# Patient Record
(demographics unavailable — no encounter records)

---

## 2025-04-29 NOTE — PROCEDURE
[FreeTextEntry1] : CXR reveals a mildly enlarged heart; no evidence of infiltrate or effusion-normal lungs  Full PFT reveals normal flows; FEV1 was 1.94 L which is 102% of predicted; normal lung volumes; normal diffusion at 17.49, which is 100% of predicted; normal flow volume loop. MASOOD: MIP 92%, % PFTs were performed to evaluate for SOB  FENO was 17; a normal value being less than 25 Fractional exhaled nitric oxide (FENO) is regarded as a simple, noninvasive method for assessing eosinophilic airway inflammation. Produced by a variety of cells within the lung, nitric oxide (NO) concentrations are generally low in healthy individuals. However, high concentrations of NO appear to be involved in nonspecific host defense mechanisms and chronic inflammatory diseases such as asthma. The American Thoracic Society (ATS) therefore has recommended using FENO to aid in the diagnosis and monitoring of eosinophilic airway inflammation and asthma, and for identifying steroid responsive individuals whose chronic respiratory symptoms may be caused by airway inflammation.

## 2025-04-29 NOTE — HISTORY OF PRESENT ILLNESS
[TextBox_4] : Ms. WARE is a 70 year old female with a history of chronic hep B (cured), HLD, HTN, gastric metaplasia, osteopenia, benign thyroid nodule, low vitamin D, anxiety, allergies, gerd  presenting to the office today for follow up pulmonary evaluation. Her chief complaint is  -she notes persistent coughing present  -she notes bowels are regular -she notes vision is stable -she notes getting enough sleep -she notes sleeping for 6-7 hours -she notes her sinuses are always dry -she denies any GERD Sx -she notes she is unsure if Xolair helped her in the past because she only did it 2-3 times  -she denies any headaches, nausea, emesis, fever, chills, sweats, chest pain, chest pressure, coughing, wheezing, palpitations, diarrhea, constipation, dysphagia, vertigo, arthralgias, myalgias, leg swelling, itchy eyes, itchy ears, heartburn, reflux, or sour taste in the mouth.

## 2025-04-29 NOTE — ADDENDUM
[FreeTextEntry1] : Documented by Ibrahima Moran acting as a scribe for Dr. Konstantin Horton on 04/29/2025. All medical record entries made by the Scribe were at my, Dr. Konstantin Horton's, direction and personally dictated by me on 04/29/2025. I have reviewed the chart and agree that the record accurately reflects my personal performance of the history, physical exam, assessment and plan. I have also personally directed, reviewed, and agree with the discharge instructions.

## 2025-04-29 NOTE — ASSESSMENT
[FreeTextEntry1] : Ms. WARE is a 70 year old female with a history of chronic hep B (cured), HLD, HTN, gastric metaplasia, osteopenia, benign thyroid nodule, low vitamin D, anxiety, allergies, gerd  presenting to the office today for a follow up pulmonary evaluation. Her chief complaint is sob/ chronic cough/ likely multifactorial/ moderate to severe asthma/ allergies/ gerd/ +mildred - NC with Xolair- persistent cough c/w untreated asthma  Her shortness of breath is multifactorial due to: -poor mechanics of breathing  -out of shape / overweight -pulmonary disease -+asthma  -cardiac disease   Problem 1: Moderate to severe persistent Asthma- active -s/p Trelegy 1 puff QD (200) move to Breztri 2 puffs BID - Add Ventolin 2 puffs Q6H, pre-exercise  -add Singulair 10 mg QHS  Asthma is  believed to be caused by inherited (genetic) and environmental factor, but its exact cause is unknown. Asthma may be triggered by allergens, lung infections, or irritants in the air. Asthma triggers are different for each person   Problem 1A: Elevated IgE (300+) -s/p xolair in progress - NC - reevaluate - patient states that her asthma symptoms have improved since starting the Xolair. Less exacerbations and use of rescue inhalers.  -Xolair is a recombinant DNA- derived humanized IgG1K monoclonal antibody that selectively binds ot human immunoglobulin E (IgE). Xolair is produced by a Chinese hamster ovary cell suspension culture in nutrient medium containing the antibiotic gentamicin. Gentamicin is not detectable in the final product. Xolair is a sterile, white, preservative free, lyophilized powder contained in a single use vial that is reconstituted with sterile water for suspension. Side effects include: wheezing, tightness of the chest, trouble breathing, hives, skin rash, feeling anxious or light-headed, fainting, warmth or tingling under skin, or swelling of face, lips, or tongue   Problem 1B: biologic eval -[place] over others -check eos/IgE -Type 2 asthma, which accounts for approximately 70% of all cases, is indicated by high eosinophil counts and elevated levels of T2 cytokines. Six biologic agents are FDA-approved to treat moderate-to-severe asthma by targeting various cytokines and cell surface receptors involved in the inflammatory process in asthma. Several biologic therapies are also indicated for other inflammatory conditions marked by high eosinophils. Efficacy of biologic therapy should be assessed after 4 to 6 months of treatment.  Problem 2: Chronic Cough (Asthma/LPR/Allergies/ sinus) DDx - #1 asthma Any cough greater than three weeks duration-differential diagnosis includes-asthma, upper airway cough syndrome, post nasal drip syndrome, gastroesophageal reflux, laryngopharyngeal reflux, cardiac disease (congestive heart failure, medicines, effects, etc), medication effects (b-blockers, ace inhibitors, ARBs, glaucoma meds, etc.), smoking, infectious, multifactorial, etc.  Inhaler technique reviewed as well as oral hygiene techniques reviewed with patient. Avoidance of cold air, extremes of temperature, rescue inhaler should be used before exercise. Order of medication reviewed with patient. Recommended use of a cool mist humidifier in the bedroom.    Problem 2: Allergies  -add Clarinex 5 mg QAM  -s/p blood work: asthma profile +, food IgE panel+, eosinophil level, IgE level +, Vitamin D level  -continue Olopatadine 0.6% 1 sniff BID  Environmental measures for allergies were encouraged including mattress and pillow covers, air purifier, and environmental controls.   Problem 2A: No TBM  -s/p dynamic CT -- within normal limits Tracheomalacia is usually acquired in adults and common causes include damage by tracheostomy or endotracheal intubation damaging the tracheal cartilage with increase risk with multiple intubations, prolonged intubation, and concurrent high dose steroid therapy; external chest wall trauma and surgery; chronic compression of the trachea by benign etiologies (eg, benign mediastinal goiter) or malignancy; relapsing polychondritis; or recurrent infection. Tracheomalacia can be asymptomatic, however signs or symptoms can develop as the severity of the airway narrowing progresses with major symptoms include dyspnea, cough, and sputum retention. Other symptoms include severe paroxysms of coughing, wheezing or stridor, barking cough and may be exacerbated by forced expiration, cough, and valsalva maneuver. Tracheomalacia is diagnosed by a bronchoscopic visualization of dynamic airway collapse on dynamic chest CT. Therapy is warranted in symptomatic patients with severe tracheomalacia and includes surgical repair as tracheobronchoplasty. The patient was referred to Dr. Miki Moran or Dr. Elier Shankar, at [facility] for a surgical consult.   Problem 3: GERD/ LPR add Protonix 40 mg QAM, pre-breakfast -Add Pepcid 40mg QHS  -Rule of 2s: avoid eating too much, eating too late, eating too spicy, eating two hours before bed. -Things to avoid including overeating, spicy foods, tight clothing, eating within three hours of bed, this list is not all inclusive.  -For treatment of reflux, possible options discussed including diet control, H2 blockers, PPIs, as well as coating motility agents discussed as treatment options. Timing of meals and proximity of last meal to sleep were discussed. If symptoms persist, a formal gastrointestinal evaluation is needed.    Problem 4: +MILDRED  -s/p dental device s/p sleep study  -s/p home sleep study -mild sleep apnea Sleep apnea is associated with adverse clinical consequences which can affect most organ systems.  Cardiovascular disease risk includes arrhythmias, atrial fibrillation, hypertension, coronary artery disease, and stroke. Metabolic disorders include diabetes type 2, non-alcoholic fatty liver disease. Mood disorder especially depression; and cognitive decline especially in the elderly. Associations with  chronic reflux/Villarreal's esophagus some but not all inclusive.  -Reasons  include arousal consistent with hypopnea; respiratory events most prominent in REM sleep or supine position; therefore sleep staging and body position are important for accurate diagnosis and estimation of AHI.    problem 5: cardiac disease -recommended to continue to follow up with Cardiologist (Harinder Moran)  problem 6: poor breathing mechanics -Recommended Essence Saha and Sharon breathing techniques  -Proper breathing techniques were reviewed with an emphasis of exhalation. Patient instructed to breath in for 1 second and out for four seconds. Patient was encouraged to not talk while walking.   problem 7: out of shape / overweight -Weight loss, exercise, and diet control were discussed and are highly encouraged. Treatment options were given such as, aqua therapy, and contacting a nutritionist. Recommended to use the elliptical, stationary bike, less use of treadmill. Mindful eating was explained to the patient Obesity is associated with worsening asthma, shortness of breath, and potential for cardiac disease, diabetes, and other underlying medical conditions  problem 8: health maintenance  -recommended Sanotize anti viral nasal spray in case of viral infection  -s/p COVID vaccination x4 -recommended yearly flu shot after October 15 -recommended strep pneumonia vaccines: Prevnar-13 vaccine, followed by Pneumo vaccine 23 one year following after 65 -recommended early intervention for Upper Respiratory Infections (URIs) -recommended regular osteoporosis evaluations -recommended early dermatological evaluations -recommended after the age of 50 to the age of 70, colonoscopy every 5 years  F/U in 4 months She is encouraged to call with any changes, concerns, or questions

## 2025-04-29 NOTE — PHYSICAL EXAM

## 2025-04-29 NOTE — REASON FOR VISIT
[Follow-Up] : a follow-up visit [TextBox_44] : sob/ chronic cough/ likely multifactorial/ moderate to severe asthma/ allergies/ gerd/ +sushant

## 2025-07-03 NOTE — CONSULT LETTER
[Dear  ___] : Dear  [unfilled], [Consult Letter:] : I had the pleasure of evaluating your patient, [unfilled]. [Please see my note below.] : Please see my note below. [Consult Closing:] : Thank you very much for allowing me to participate in the care of this patient.  If you have any questions, please do not hesitate to contact me. [Sincerely,] : Sincerely, [FreeTextEntry3] : Fransico Zuñiga MD

## 2025-07-03 NOTE — REVIEW OF SYSTEMS
[Seasonal Allergies] : seasonal allergies [Problem Snoring] : problem snoring [Hoarseness] : hoarseness [Throat Clearing] : throat clearing [Throat Dryness] : throat dryness [Throat Itching] : throat itching [Cough] : cough [Joint Pain] : joint pain [Easy Bruising] : a tendency for easy bruising [Negative] : Endocrine

## 2025-07-03 NOTE — ASSESSMENT
[FreeTextEntry1] : Jacquelyn Garcia presents for evaluation of chronic cough. She has history of allergic rhinitis, asthma and GERD. She has been on Xolair and Olopatadine in the past but is not using currently. She is on Breztri currently as per her pulmonologist. She does not use antireflux medications. Flexible laryngoscopy was performed showing postcricoid inflammation indicating laryngopharyngeal reflux. We discussed antireflux precautions and handout was provided. She will continue f/u with pulmonologist.  - f/u 1 mo

## 2025-07-03 NOTE — HISTORY OF PRESENT ILLNESS
[de-identified] : Jacquelyn Garcia is a 70-year-old with history of hypertension, asthma and allergic rhinitis referred by Dr. Moran for evaluation of chronic cough. She notes she has daily productive cough that is worse at night for years. She is being followed by Dr. Horton for asthma and chronic cough with GERD. She is not currently on PPI. She was recently recommended to start dupixent but was unable to afford copay for this. She was on Xolair for several doses without relief and discontinued this. She is on Breztri inhalation. She notes daily postnasal drainage with excessive throat clearing. She denies sinus pressure, nasal congestion and rhinorrhea. She notes prior positive allergy testing. She denies heartburn. She denies dysphagia, dyspnea, aspiration episodes. She notes intermittent hoarseness after prolonged cough.  She notes odynophagia after prolonged cough. She denies fever or unintentional weight loss. She is not using Olopatadine  nasal spray.